# Patient Record
Sex: MALE | Employment: OTHER | URBAN - METROPOLITAN AREA
[De-identification: names, ages, dates, MRNs, and addresses within clinical notes are randomized per-mention and may not be internally consistent; named-entity substitution may affect disease eponyms.]

---

## 2020-02-25 ENCOUNTER — TELEPHONE (OUTPATIENT)
Dept: GASTROENTEROLOGY | Facility: CLINIC | Age: 75
End: 2020-02-25

## 2020-02-25 NOTE — TELEPHONE ENCOUNTER
Pt wanting to know when next colonoscopy due. Last colonoscopy with Dr Nicholas Parra 4/18/2026 with 5 year recall 4/18/2021. Call sooner if symptoms. Requested message also leave on voicemail. This was done.

## 2021-03-18 ENCOUNTER — TELEPHONE (OUTPATIENT)
Dept: GASTROENTEROLOGY | Facility: CLINIC | Age: 76
End: 2021-03-18

## 2021-03-18 NOTE — TELEPHONE ENCOUNTER
----- Message from Lauren Delgadillo RN sent at 4/19/2016  6:37 PM CDT -----  Regarding: recall colon  Recall colon in 5 years per GS.  Colon done 4/18/16

## 2021-04-12 ENCOUNTER — TELEPHONE (OUTPATIENT)
Dept: GASTROENTEROLOGY | Facility: CLINIC | Age: 76
End: 2021-04-12

## 2021-04-12 DIAGNOSIS — Z80.0 FAMILY HISTORY OF COLON CANCER: ICD-10-CM

## 2021-04-12 DIAGNOSIS — Z86.010 PERSONAL HISTORY OF COLONIC POLYPS: Primary | ICD-10-CM

## 2021-04-13 NOTE — TELEPHONE ENCOUNTER
Last Procedure, Date, MD: 04/18/2016, Dr Sunday Pro,  Colonoscopy   Last Diagnosis:  See pathology report below  Recalled for (mth/yrs): 5 years  Sedation used previously:  IV  Last Prep Used (if known):  MiraLax/Gatorade  Quality of prep (if known): goo

## 2021-04-13 NOTE — TELEPHONE ENCOUNTER
Mary Gonzalez #:   [id number]                    Room: [facility]  Kobe CA #:  [id number]     ADMITTED:   04/18/2016        DATE OF PROCEDURE: 4/18/16     PREOPERATIVE DIAGNOSIS: Family history of colon cancer. POSTOPERATIVE DIAGNOSES:  1. or vascular anomalies seen. Retroflexion in  the rectum revealed no abnormalities. The procedure was well tolerated  without immediate complication. IMPRESSION:  1. Transverse colon polyp. 2. Mild sigmoid colon diverticulosis. RECOMMENDATIONS:  1. abnormalities. The prostate  was slightly enlarged, but smooth and without nodules.  An Olympus variable  stiffness 180 series HD colonoscope was inserted into the rectum and  advanced under direct vision by following the lumen to the cecum, ileocecal  valv

## 2021-04-13 NOTE — TELEPHONE ENCOUNTER
The patient's chart has been reviewed. Okay to schedule pt for 5 year colonoscopy recall r/t history of colon polyps, family history of colon cancer with Dr. Sunday Pro.      Advise IV twilight or MAC sedation with split dose MiraLAX/Gatorade (related to

## 2021-04-20 NOTE — TELEPHONE ENCOUNTER
Scheduled for:  Colonoscopy - 39[address]  Provider Name:  Dr. Lizbeth Jurado  Date:  5/24/21  Location:  [facility]  Sedation:  MAC  Time:  8:30 am (pt is aware to arrive at 7:30 am)  Prep:  Miralax/Gatorade, Prep instructions were given to pt over the phone, pt verbali

## 2021-05-21 ENCOUNTER — LAB ENCOUNTER (OUTPATIENT)
Dept: LAB | Age: 76
End: 2021-05-21
Attending: INTERNAL MEDICINE
Payer: COMMERCIAL

## 2021-05-21 DIAGNOSIS — Z01.818 PRE-OP TESTING: ICD-10-CM

## 2021-05-24 ENCOUNTER — HOSPITAL ENCOUNTER (OUTPATIENT)
Facility: HOSPITAL | Age: 76
Setting detail: HOSPITAL OUTPATIENT SURGERY
Discharge: HOME | End: 2021-05-24
Attending: INTERNAL MEDICINE | Admitting: INTERNAL MEDICINE
Payer: COMMERCIAL

## 2021-05-24 ENCOUNTER — ANESTHESIA EVENT (OUTPATIENT)
Dept: ENDOSCOPY | Facility: HOSPITAL | Age: 76
End: 2021-05-24
Payer: COMMERCIAL

## 2021-05-24 ENCOUNTER — ANESTHESIA (OUTPATIENT)
Dept: ENDOSCOPY | Facility: HOSPITAL | Age: 76
End: 2021-05-24
Payer: COMMERCIAL

## 2021-05-24 VITALS
HEIGHT: 65 IN | SYSTOLIC BLOOD PRESSURE: 119 MMHG | WEIGHT: 143 LBS | OXYGEN SATURATION: 98 % | TEMPERATURE: 98 F | BODY MASS INDEX: 23.82 KG/M2 | DIASTOLIC BLOOD PRESSURE: 95 MMHG | HEART RATE: 55 BPM | RESPIRATION RATE: 20 BRPM

## 2021-05-24 DIAGNOSIS — Z80.0 FAMILY HISTORY OF COLON CANCER: ICD-10-CM

## 2021-05-24 DIAGNOSIS — Z86.010 PERSONAL HISTORY OF COLONIC POLYPS: ICD-10-CM

## 2021-05-24 DIAGNOSIS — Z01.818 PRE-OP TESTING: Primary | ICD-10-CM

## 2021-05-24 PROCEDURE — 0DBH8ZX EXCISION OF CECUM, VIA NATURAL OR ARTIFICIAL OPENING ENDOSCOPIC, DIAGNOSTIC: ICD-10-PCS | Performed by: INTERNAL MEDICINE

## 2021-05-24 PROCEDURE — 0DBM8ZX EXCISION OF DESCENDING COLON, VIA NATURAL OR ARTIFICIAL OPENING ENDOSCOPIC, DIAGNOSTIC: ICD-10-PCS | Performed by: INTERNAL MEDICINE

## 2021-05-24 PROCEDURE — 45385 COLONOSCOPY W/LESION REMOVAL: CPT | Performed by: INTERNAL MEDICINE

## 2021-05-24 PROCEDURE — 45380 COLONOSCOPY AND BIOPSY: CPT | Performed by: INTERNAL MEDICINE

## 2021-05-24 PROCEDURE — 0DBP8ZX EXCISION OF RECTUM, VIA NATURAL OR ARTIFICIAL OPENING ENDOSCOPIC, DIAGNOSTIC: ICD-10-PCS | Performed by: INTERNAL MEDICINE

## 2021-05-24 RX ORDER — MULTIVIT WITH MINERALS/LUTEIN
250 TABLET ORAL DAILY
COMMUNITY

## 2021-05-24 RX ORDER — ERGOCALCIFEROL (VITAMIN D2) 10 MCG
TABLET ORAL
COMMUNITY

## 2021-05-24 RX ORDER — SODIUM CHLORIDE, SODIUM LACTATE, POTASSIUM CHLORIDE, CALCIUM CHLORIDE 600; 310; 30; 20 MG/100ML; MG/100ML; MG/100ML; MG/100ML
INJECTION, SOLUTION INTRAVENOUS CONTINUOUS
Status: DISCONTINUED | OUTPATIENT
Start: 2021-05-24 | End: 2021-05-24

## 2021-05-24 RX ORDER — FAMOTIDINE 20 MG
TABLET ORAL
COMMUNITY

## 2021-05-24 RX ORDER — OMEGA-3-ACID ETHYL ESTERS 1 G/1
1 CAPSULE, LIQUID FILLED ORAL DAILY
COMMUNITY

## 2021-05-24 RX ORDER — BIOTIN 1 MG
1 TABLET ORAL DAILY
COMMUNITY

## 2021-05-24 RX ORDER — LIDOCAINE HYDROCHLORIDE 10 MG/ML
INJECTION, SOLUTION EPIDURAL; INFILTRATION; INTRACAUDAL; PERINEURAL AS NEEDED
Status: DISCONTINUED | OUTPATIENT
Start: 2021-05-24 | End: 2021-05-24 | Stop reason: SURG

## 2021-05-24 RX ADMIN — SODIUM CHLORIDE, SODIUM LACTATE, POTASSIUM CHLORIDE, CALCIUM CHLORIDE: 600; 310; 30; 20 INJECTION, SOLUTION INTRAVENOUS at 08:54:00

## 2021-05-24 RX ADMIN — SODIUM CHLORIDE, SODIUM LACTATE, POTASSIUM CHLORIDE, CALCIUM CHLORIDE: 600; 310; 30; 20 INJECTION, SOLUTION INTRAVENOUS at 09:09:00

## 2021-05-24 RX ADMIN — LIDOCAINE HYDROCHLORIDE 50 MG: 10 INJECTION, SOLUTION EPIDURAL; INFILTRATION; INTRACAUDAL; PERINEURAL at 08:54:00

## 2021-05-24 NOTE — ANESTHESIA PREPROCEDURE EVALUATION
Anesthesia PreOp Note    HPI:     Cecil Duncan is a 70year old male who presents for preoperative consultation requested by: Maury Levine MD    Date of Surgery: 5/24/2021    Procedure(s):  COLONOSCOPY  Indication: Personal history of colonic po Smoker      Smokeless tobacco: Never Used    Vaping Use      Vaping Use: Never used    Substance and Sexual Activity      Alcohol use: Never      Drug use: Not on file      Sexual activity: Not on file    Other Topics      Concerns:        Not on file    S negative ROS   Abdominal  - normal exam               Anesthesia Plan:   ASA:  2  Plan:   MAC  Informed Consent Plan and Risks Discussed With:  Patient  Discussed plan with:  CRNA and surgeon      I have informed Emanuel Gtz and/or legal guardian or fam

## 2021-05-24 NOTE — OPERATIVE REPORT
[facility] Endoscopy Report      Date of Procedure:  05/24/21      Preoperative Diagnosis:  1. Colorectal cancer screening  2. Family history of colon cancer      Postoperative Diagnosis:  1. Colon polyps  2.   Sigmoid colon diverticulosi signs of inflammation seen. Retroflexion in the rectum revealed no abnormalities. The procedure was well tolerated without immediate complication. Impression:  1. Diminutive colon polyps  2.   Sigmoid colon diverticulosis, currently uncomplicated  3

## 2021-05-24 NOTE — ANESTHESIA POSTPROCEDURE EVALUATION
Patient: Rolo Nieves    Procedure Summary     Date: 05/24/21 Room / Location: [address] [place] [address] ENDOSCOPY 04 / [address] [place] [address] ENDOSCOPY    Anesthesia Start: 0203 Anesthesia Stop:     Procedure: COLONOSCOPY (N/A ) Diagnosis:       Personal history of colonic polyps      Family h

## 2021-05-24 NOTE — H&P
History & Physical Examination    Patient Name: Miguel Lynch  MRN: [id number]  [facility]: [id number]  YOB: 1945    Diagnosis: Colorectal cancer screening, family history of colon cancer      Multiple Vitamin (DAILY VALUE MULTIVITAMIN) Oral Tab,

## 2021-05-26 ENCOUNTER — TELEPHONE (OUTPATIENT)
Dept: GASTROENTEROLOGY | Facility: CLINIC | Age: 76
End: 2021-05-26

## 2021-05-26 NOTE — TELEPHONE ENCOUNTER
----- Message from Dwayne Rooney MD sent at 5/25/2021  6:20 PM CDT -----  I spoke to the patient. He is feeling well. He had #2 subcentimeter polyps removed representing a sessile serrated adenoma and a tubular adenoma.   Uncomplicated sigmoid colo

## 2021-05-26 NOTE — TELEPHONE ENCOUNTER
Health Maintenance Updated. 5 year colonoscopy recall entered into patient outreach in [address] [place] [address] Rd. Next colonoscopy will be due 5/24/2026. Routed to Dr. Lorene Bhatia in [facility] disregard.

## 2025-07-14 ENCOUNTER — HOSPITAL ENCOUNTER (EMERGENCY)
Facility: HOSPITAL | Age: 80
Discharge: HOME | End: 2025-07-14
Attending: EMERGENCY MEDICINE
Payer: COMMERCIAL

## 2025-07-14 VITALS
HEART RATE: 67 BPM | BODY MASS INDEX: 23.9 KG/M2 | HEIGHT: 64 IN | OXYGEN SATURATION: 93 % | RESPIRATION RATE: 16 BRPM | DIASTOLIC BLOOD PRESSURE: 78 MMHG | TEMPERATURE: 98 F | SYSTOLIC BLOOD PRESSURE: 149 MMHG | WEIGHT: 140 LBS

## 2025-07-14 DIAGNOSIS — R33.9 URINARY RETENTION: Primary | ICD-10-CM

## 2025-07-14 LAB
BILIRUB UR QL: NEGATIVE
CLARITY UR: CLEAR
GLUCOSE UR-MCNC: NORMAL MG/DL
KETONES UR-MCNC: NEGATIVE MG/DL
LEUKOCYTE ESTERASE UR QL STRIP.AUTO: NEGATIVE
PH UR: 5.5 [PH] (ref 5–8)
PROT UR-MCNC: 100 MG/DL
RBC #/AREA URNS AUTO: >10 /HPF
SP GR UR STRIP: 1.02 (ref 1–1.03)
UROBILINOGEN UR STRIP-ACNC: NORMAL

## 2025-07-14 PROCEDURE — 81001 URINALYSIS AUTO W/SCOPE: CPT | Performed by: EMERGENCY MEDICINE

## 2025-07-14 PROCEDURE — 87086 URINE CULTURE/COLONY COUNT: CPT | Performed by: EMERGENCY MEDICINE

## 2025-07-14 PROCEDURE — 99283 EMERGENCY DEPT VISIT LOW MDM: CPT

## 2025-07-14 PROCEDURE — 51702 INSERT TEMP BLADDER CATH: CPT

## 2025-07-14 NOTE — ED INITIAL ASSESSMENT (HPI)
Patient to the ED from home for complaint of pressure and pain  to the penis. Pt states he has prostrate cancer and had biopsy on Friday [facility] and [facility] the pain now is too much, unable to sit in triage. AO 4.

## 2025-07-14 NOTE — ED PROVIDER NOTES
Patient Seen in: [facility] Emergency Department        History  Chief Complaint   Patient presents with    Urinary Symptoms     Stated Complaint: Urinary symptoms    Subjective:   HPI          Pt is 78 yo M who p/w minimal urination x 3 days s/p prostate biopsy. No fevers or vomiting. No constipation.  Feels like he is retaining. No back pain.       Objective:     Past Medical History:    High cholesterol              Past Surgical History:   Procedure Laterality Date    Colonoscopy N/A 5/24/2021    Procedure: COLONOSCOPY;  Surgeon: Dean Granados MD;  Location: [facility] ENDOSCOPY                Social History     Socioeconomic History    Marital status:    Tobacco Use    Smoking status: Never    Smokeless tobacco: Never   Vaping Use    Vaping status: Never Used   Substance and Sexual Activity    Alcohol use: Never                                Physical Exam    ED Triage Vitals [07/14/25 0149]   BP (!) 201/84   Pulse 97   Resp 16   Temp 98.1 °F (36.7 °C)   Temp src Oral   SpO2 96 %   O2 Device None (Room air)       Current Vitals:   Vital Signs  BP: 153/80  Pulse: 68  Resp: 16  Temp: 98.1 °F (36.7 °C)  Temp src: Oral    Oxygen Therapy  SpO2: 96 %  O2 Device: None (Room air)            Physical Exam  GENERAL: No acute distress, awake and alert  HEENT: EOMI, PERRL  RESP: no tachypnea or retractions  Abd: soft, nontender, +suprapubic distension  Extremities: FROM of all extremities  Neuro: CN intact, normal speech, normal gait, 5/5 motor strength in all extremities, no focal deficits          ED Course  Labs Reviewed   URINALYSIS WITH CULTURE REFLEX - Abnormal; Notable for the following components:       Result Value    Blood Urine 3+ (*)     Protein Urine 100 (*)     Nitrite Urine 1+ (*)     RBC Urine >10 (*)     Bacteria Urine Rare (*)     All other components within normal limits   URINE CULTURE, ROUTINE                   MDM     Medical Decision Making  Bladder scan >500 ml urine  Huston catheter  placed by RN without complications. Pt feels improved on reassessment  Advised on urology f/u and care of catheter at home     Amount and/or Complexity of Data Reviewed  External Data Reviewed: radiology and notes.     Details: Recent MRI 6/2025 reviewed  Urology notes/procedure notes from 7/2025 reviewed  Labs: ordered.        Disposition and Plan     Clinical Impression:  1. Urinary retention         Disposition:  Discharge  7/14/2025  3:16 am    Follow-up:  your urologist    Call      We recommend that you schedule follow up care with a primary care provider within the next three months to obtain basic health screening including reassessment of your blood pressure.      Medications Prescribed:  Current Discharge Medication List                Supplementary Documentation:

## (undated) DEVICE — SNARE CAPTIFLEX MICRO-OVL OLY

## (undated) DEVICE — SNARE OPTMZ PLPCTM TRP

## (undated) DEVICE — Device: Brand: DEFENDO AIR/WATER/SUCTION AND BIOPSY VALVE

## (undated) DEVICE — Device: Brand: CUSTOM PROCEDURE KIT

## (undated) DEVICE — MEDI-VAC NON-CONDUCTIVE SUCTION TUBING 6MM X 1.8M (6FT.) L: Brand: CARDINAL HEALTH

## (undated) DEVICE — LINE MNTR ADLT SET O2 INTMD

## (undated) DEVICE — 35 ML SYRINGE REGULAR TIP: Brand: MONOJECT

## (undated) DEVICE — FORCEP RADIAL JAW 4

## (undated) NOTE — LETTER
3/18/2021    Javi Young        [address] [facility]            Dear Javi Young,      Our records indicate that you are due for an appointment for a Colonoscopy in April 2021, or shortly there after, with JEREMY Quarles